# Patient Record
Sex: FEMALE | Race: WHITE | Employment: OTHER | URBAN - METROPOLITAN AREA
[De-identification: names, ages, dates, MRNs, and addresses within clinical notes are randomized per-mention and may not be internally consistent; named-entity substitution may affect disease eponyms.]

---

## 2021-11-03 ENCOUNTER — OFFICE VISIT (OUTPATIENT)
Dept: SURGERY | Age: 81
End: 2021-11-03
Payer: MEDICARE

## 2021-11-03 VITALS
SYSTOLIC BLOOD PRESSURE: 136 MMHG | DIASTOLIC BLOOD PRESSURE: 69 MMHG | BODY MASS INDEX: 26.46 KG/M2 | WEIGHT: 134.8 LBS | RESPIRATION RATE: 18 BRPM | HEIGHT: 60 IN | OXYGEN SATURATION: 96 % | TEMPERATURE: 98.6 F | HEART RATE: 84 BPM

## 2021-11-03 DIAGNOSIS — K43.5 PARASTOMAL HERNIA WITHOUT OBSTRUCTION OR GANGRENE: Primary | ICD-10-CM

## 2021-11-03 PROCEDURE — G8536 NO DOC ELDER MAL SCRN: HCPCS | Performed by: SURGERY

## 2021-11-03 PROCEDURE — G8400 PT W/DXA NO RESULTS DOC: HCPCS | Performed by: SURGERY

## 2021-11-03 PROCEDURE — 1090F PRES/ABSN URINE INCON ASSESS: CPT | Performed by: SURGERY

## 2021-11-03 PROCEDURE — G8419 CALC BMI OUT NRM PARAM NOF/U: HCPCS | Performed by: SURGERY

## 2021-11-03 PROCEDURE — 99205 OFFICE O/P NEW HI 60 MIN: CPT | Performed by: SURGERY

## 2021-11-03 PROCEDURE — G8427 DOCREV CUR MEDS BY ELIG CLIN: HCPCS | Performed by: SURGERY

## 2021-11-03 PROCEDURE — 1101F PT FALLS ASSESS-DOCD LE1/YR: CPT | Performed by: SURGERY

## 2021-11-03 PROCEDURE — G8510 SCR DEP NEG, NO PLAN REQD: HCPCS | Performed by: SURGERY

## 2021-11-03 NOTE — PROGRESS NOTES
1. Have you been to the ER, urgent care clinic since your last visit? Hospitalized since your last visit? No     2. Have you seen or consulted any other health care providers outside of the [address] [place] [address] since your last visit? Include any pap smears or colon screening.  No

## 2021-11-05 NOTE — PROGRESS NOTES
[facility] Surgical Specialists at [facility] [place] Surgery History and Physical    History of Present Illness:      Yuval Devi is a 80 y.o. female who has a history of an open cystectomy with ileal conduit. She has had 2 prior attempts at parastomal hernia repairs. The first was done by Dr. Gaviota Jamil and the second was done by Dr. Gunner Gutierrez in 2018. Her second repair with Dr. Gunner Gutierrez was done laparoscopically and also had an incisional hernia repair done at the same time. After reading the operation note it appears he did a combination of open repair with primary figure-of-eight sutures and then did a laparoscopic repair with a 9 x 15 cm piece of Prograf mesh done in a keyhole fashion with the mesh being intraperitoneal.  We then used a second piece of 9 x 15 cm Prograf mesh for the incisional hernia repair. 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 She does not have any pain from the hernia. She does have a large bulge. She has been having normal bowel movements. Her ileal conduit seems to be working just fine.     Past Medical History:   Diagnosis Date    Arthritis     BACK AND VERTEBRAE     Burning with urination     Urostomy    Cancer ([facility] [address] 07/2014    BLADDER    Osteopenia     Thyroid disease        Past Surgical History:   Procedure Laterality Date    HX APPENDECTOMY  1952    HX CATARACT REMOVAL  2009    EDDY    HX HYSTERECTOMY      HX KNEE ARTHROSCOPY Left 2001    AND MENISCECTOMY    HX OTHER SURGICAL  1966    LIPOMA-BACK    HX TONSILLECTOMY  1946    HX UROLOGICAL  2014    TURBT X 2 ( 7/30 AND 8/27     HX UROLOGICAL  2014    CYSTOSCOPY W/ BX  AND 1/5/2015    HX UROLOGICAL  01/05/2015    RADICAL CYSTECTOMY , ILEAL CONDUIT DIVERSION WITH PELVIC LYMPH NODE DISSECTION AND VERA BSO    VASCULAR SURGERY PROCEDURE UNLIST  1997    L LEG VEIN STRIPPING    VASCULAR SURGERY PROCEDURE UNLIST  2012    SCLEROTHERAPY & ELP L LEG         Current Outpatient Medications:     ferrous sulfate 325 mg (65 mg iron) tablet, Take 1 Tab by mouth Daily (before breakfast). , Disp: , Rfl:     multivitamin (ONE A DAY) tablet, Take 1 Tab by mouth daily. , Disp: , Rfl:     OTHER, KUSH C WITH BIOFLAVONOIDS  500MG DAILY, Disp: , Rfl:     VIT A/VIT C/VIT E/ZINC/COPPER (PRESERVISION AREDS PO), Take  by mouth. 2 SOFT GELS  A DAY, Disp: , Rfl:     docusate sodium (COLACE) 100 mg capsule, Take 100 mg by mouth two (2) times a day., Disp: , Rfl:     magnesium 250 mg tab, Take 250 mg by mouth daily. , Disp: , Rfl:     ASCORBATE CALCIUM (KUSH-C PO), Take 500 mg by mouth daily. , Disp: , Rfl:     DOCOSAHEXANOIC ACID/EPA (FISH OIL PO), Take 1,200 mg by mouth daily. 1200 mg , Disp: , Rfl:     ASPIRIN (ASPIR-81 PO), Take 1 Tab by mouth daily. (Patient not taking: Reported on 11/3/2021), Disp: , Rfl:     HYDROcodone-acetaminophen (NORCO) 7.5-325 mg per tablet, Take 1 Tab by mouth every six (6) hours as needed for Pain. Max Daily Amount: 4 Tabs. (Patient not taking: Reported on 11/3/2021), Disp: 10 Tab, Rfl: o    HYDROcodone-acetaminophen (NORCO) 7.5-325 mg per tablet, Take 1 tablet by mouth every six (6) hours as needed for Pain. (Patient not taking: Reported on 11/3/2021), Disp: 20 tablet, Rfl: o    lutein 6 mg tab, Take  by mouth daily. (Patient not taking: Reported on 11/3/2021), Disp: , Rfl:     No Known Allergies    Social History     Socioeconomic History    Marital status: SINGLE     Spouse name: Not on file    Number of children: Not on file    Years of education: Not on file    Highest education level: Not on file   Occupational History    Not on file   Tobacco Use    Smoking status: Former Smoker     Packs/day: 1.00     Years: 30.00     Pack years: 30.00     Quit date: 1995     Years since quittin.8    Smokeless tobacco: Never Used   Substance and Sexual Activity    Alcohol use:  Yes     Alcohol/week: 2.0 standard drinks     Types: 2 Shots of liquor per week     Comment: 2 GLASSES OF SCOTCH EVERY WEEKEND    Drug use: No    Sexual activity: Not on file   Other Topics Concern    Not on file   Social History Narrative    Not on file     Social Determinants of Health     Financial Resource Strain:     Difficulty of Paying Living Expenses: Not on file   Food Insecurity:     Worried About Running Out of Food in the Last Year: Not on file    Terrell of Food in the Last Year: Not on file   Transportation Needs:     Lack of Transportation (Medical): Not on file    Lack of Transportation (Non-Medical):  Not on file   Physical Activity:     Days of Exercise per Week: Not on file    Minutes of Exercise per Session: Not on file   Stress:     Feeling of Stress : Not on file   Social Connections:     Frequency of Communication with Friends and Family: Not on file    Frequency of Social Gatherings with Friends and Family: Not on file    Attends Protestant Services: Not on file    Active Member of [address] [place] [address] [employer] or Organizations: Not on file    Attends Club or Organization Meetings: Not on file    Marital Status: Not on file   Intimate Partner Violence:     Fear of Current or Ex-Partner: Not on file    Emotionally Abused: Not on file    Physically Abused: Not on file    Sexually Abused: Not on file   Housing Stability:     Unable to Pay for Housing in the Last Year: Not on file    Number of [place] in the Last Year: Not on file    Unstable Housing in the Last Year: Not on file       Family History   Problem Relation Age of Onset    Cancer Mother         KIDNEY    Lung Disease Father         COPD    Heart Disease Father     Diabetes Sister         TYPE II    Asthma Sister     Anesth Problems Neg Hx     Alcohol abuse Neg Hx        ROS   Constitutional: negative  Ears, Nose, Mouth, Throat, and Face: negative  Respiratory: negative  Cardiovascular: negative  Gastrointestinal: Parastomal hernia with ileal conduit  Genitourinary:negative  Integument/Breast: negative  Hematologic/Lymphatic: negative  Behavioral/Psychiatric: negative  Allergic/Immunologic: negative      Physical Exam:     Visit Vitals  /69 (BP 1 Location: Right arm, BP Patient Position: Sitting, BP Cuff Size: Adult)   Pulse 84   Temp 98.6 °F (37 °C) (Oral)   Resp 18   Ht 5' (1.524 m)   Wt 134 lb 12.8 oz (61.1 kg)   SpO2 96%   BMI 26.33 kg/m²       General - alert and oriented, no apparent distress  HEENT - no jaundice, no hearing imparement  Pulm - CTAB, no C/W/R  CV - RRR, no M/R/G  Abd -soft, nondistended, bowel sounds present, parastomal hernia present with large bulge in the area of the ileal conduit, ileal conduit ileum appears to be viable with urine in the bag  Ext - pulses intact in UE and LE bilaterally, no edema  Skin - supple, no rashes  Psychiatric - normal affect, good mood    Labs  Lab Results   Component Value Date/Time    Sodium 139 10/18/2016 03:49 AM    Potassium 3.6 10/18/2016 03:49 AM    Chloride 106 10/18/2016 03:49 AM    CO2 26 10/18/2016 03:49 AM    Anion gap 7 10/18/2016 03:49 AM    Glucose 122 (H) 10/18/2016 03:49 AM    BUN 5 (L) 10/18/2016 03:49 AM    Creatinine 0.81 10/18/2016 03:49 AM    BUN/Creatinine ratio 6 (L) 10/18/2016 03:49 AM    GFR est AA >60 10/18/2016 03:49 AM    GFR est non-AA >60 10/18/2016 03:49 AM    Calcium 8.3 (L) 10/18/2016 03:49 AM    Bilirubin, total 0.5 10/07/2016 11:36 AM    Alk.  phosphatase 76 10/07/2016 11:36 AM    Protein, total 7.3 10/07/2016 11:36 AM    Albumin 4.1 10/07/2016 11:36 AM    Globulin 3.2 10/07/2016 11:36 AM    A-G Ratio 1.3 10/07/2016 11:36 AM    ALT (SGPT) 17 10/07/2016 11:36 AM    AST (SGOT) 23 10/07/2016 11:36 AM     Lab Results   Component Value Date/Time    WBC 4.4 10/07/2016 11:36 AM    Hemoglobin (POC) 9.5 (A) 10/17/2016 06:38 AM    HGB 7.9 (L) 10/18/2016 03:56 AM    HCT 25.5 (L) 10/18/2016 03:56 AM    PLATELET 493 57/37/3526 11:36 AM    MCV 82.3 10/07/2016 11:36 AM         Imaging  CT scan from [facility]-cystectomy with right lower quadrant ileal conduit mild left hydronephrosis unchanged from prior CT scan, parastomal hernia at the ileal conduit containing the right colon as well as the terminal ileum, no evidence of obstruction or incarceration  I have reviewed and agree with all of the pertinent images    Assessment:     Jan Flores is a 80 y.o. female with multiply recurrent parastomal hernia    Recommendations:     1. The patient has a multiply recurrent complex parastomal hernia. At this point the patient is asymptomatic from the hernia. Repair of this is possible although would be likely somewhat difficult. The mesh technique that was used for repair by Dr. Lashay Wayne is prone to failure. The better repair for her would be a robotic [place] repair with mesh. Using the keyhole technique leaves the gap so that the hernias can recur. Since the patient is not symptomatic from the hernia at this point right now I will hold off on surgery for the meantime. I will get a CT scan in our system so I can review the images further and make a better assessment. I will have her come back in about 3 months to see how she is doing. We can attempt repair but no that this repair may be very difficult. Lila Aguilera MD    Greater than half of the time: 60 minutes was used in counciling the patient about diagnosis and treatment plan    Ms. Alexx High has a reminder for a \"due or due soon\" health maintenance. I have asked that she contact her primary care provider for follow-up on this health maintenance.

## 2021-11-20 ENCOUNTER — HOSPITAL ENCOUNTER (OUTPATIENT)
Dept: CT IMAGING | Age: 81
Discharge: HOME | End: 2021-11-20
Attending: SURGERY
Payer: MEDICARE

## 2021-11-20 DIAGNOSIS — K43.5 PARASTOMAL HERNIA WITHOUT OBSTRUCTION OR GANGRENE: ICD-10-CM

## 2021-11-20 PROCEDURE — 74177 CT ABD & PELVIS W/CONTRAST: CPT

## 2021-11-20 PROCEDURE — 74011000636 HC RX REV CODE- 636: Performed by: RADIOLOGY

## 2021-11-20 RX ADMIN — IOPAMIDOL 100 ML: 755 INJECTION, SOLUTION INTRAVENOUS at 10:58

## 2022-02-23 ENCOUNTER — OFFICE VISIT (OUTPATIENT)
Dept: SURGERY | Age: 82
End: 2022-02-23
Payer: MEDICARE

## 2022-02-23 VITALS
RESPIRATION RATE: 18 BRPM | TEMPERATURE: 98 F | SYSTOLIC BLOOD PRESSURE: 155 MMHG | HEART RATE: 80 BPM | HEIGHT: 60 IN | DIASTOLIC BLOOD PRESSURE: 75 MMHG | BODY MASS INDEX: 26.62 KG/M2 | WEIGHT: 135.6 LBS | OXYGEN SATURATION: 94 %

## 2022-02-23 DIAGNOSIS — K43.5 PARASTOMAL HERNIA WITHOUT OBSTRUCTION OR GANGRENE: Primary | ICD-10-CM

## 2022-02-23 PROCEDURE — 1101F PT FALLS ASSESS-DOCD LE1/YR: CPT | Performed by: SURGERY

## 2022-02-23 PROCEDURE — G8427 DOCREV CUR MEDS BY ELIG CLIN: HCPCS | Performed by: SURGERY

## 2022-02-23 PROCEDURE — G8419 CALC BMI OUT NRM PARAM NOF/U: HCPCS | Performed by: SURGERY

## 2022-02-23 PROCEDURE — G8510 SCR DEP NEG, NO PLAN REQD: HCPCS | Performed by: SURGERY

## 2022-02-23 PROCEDURE — G8536 NO DOC ELDER MAL SCRN: HCPCS | Performed by: SURGERY

## 2022-02-23 PROCEDURE — G8400 PT W/DXA NO RESULTS DOC: HCPCS | Performed by: SURGERY

## 2022-02-23 PROCEDURE — 1090F PRES/ABSN URINE INCON ASSESS: CPT | Performed by: SURGERY

## 2022-02-23 PROCEDURE — 99215 OFFICE O/P EST HI 40 MIN: CPT | Performed by: SURGERY

## 2022-02-23 RX ORDER — LEVOTHYROXINE SODIUM 75 UG/1
75 TABLET ORAL EVERY OTHER DAY
COMMUNITY

## 2022-02-23 RX ORDER — LEVOTHYROXINE SODIUM 88 UG/1
88 TABLET ORAL EVERY OTHER DAY
COMMUNITY

## 2022-02-23 NOTE — PROGRESS NOTES
1. Have you been to the ER, urgent care clinic since your last visit? Hospitalized since your last visit? No    2. Have you seen or consulted any other health care providers outside of the [address] [place] [address] since your last visit? Include any pap smears or colon screening.  No

## 2022-02-23 NOTE — LETTER
2/23/2022    Patient: Sybil Tejada   YOB: 1940   Date of Visit: 2/23/2022     Sherial Fothergill, MD  [facility] [address] [place] [id number]  Via Fax: [phone]    Dear Sherial Fothergill, MD,      Thank you for referring Ms. Layla Partida to [address] for evaluation. My notes for this consultation are attached. If you have questions, please do not hesitate to call me. I look forward to following your patient along with you.       Sincerely,    Consuelo Chow MD

## 2022-02-23 NOTE — PROGRESS NOTES
[facility] [place] Surgical Specialists at [facility] Surgery History and Physical    History of Present Illness:      Torito Carrillo is a 80 y.o. female who has a history of an open cystectomy with ileal conduit. She has had 2 prior attempts at parastomal hernia repairs. The first was done by Dr. Taj Valenzuela and the second was done by Dr. Cheryl Cee in 2018. Her second repair with Dr. Cheryl Cee was done laparoscopically and also had an incisional hernia repair done at the same time. After reading the operation note it appears he did a combination of open repair with primary figure-of-eight sutures and then did a laparoscopic repair with a 9 x 15 cm piece of Prograf mesh done in a keyhole fashion with the mesh being intraperitoneal.  We then used a second piece of 9 x 15 cm Prograf mesh for the incisional hernia repair. 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 She does not have any pain from the hernia. She does have a large bulge. She has been having normal bowel movements. Her ileal conduit seems to be working just fine    She is back to see me after her CT scan. She does not have any changes in the hernia. She still does not have any pain from the ileal conduit parastomal hernia.     Past Medical History:   Diagnosis Date    Arthritis     BACK AND VERTEBRAE     Burning with urination     Urostomy    Cancer ([facility] [address] 07/2014    BLADDER    Osteopenia     Thyroid disease        Past Surgical History:   Procedure Laterality Date    HX APPENDECTOMY  1952    HX CATARACT REMOVAL  2009    EDDY    HX HYSTERECTOMY      HX KNEE ARTHROSCOPY Left 2001    AND MENISCECTOMY    HX OTHER SURGICAL  1966    LIPOMA-BACK    HX TONSILLECTOMY  1946    HX UROLOGICAL  2014    TURBT X 2 ( 7/30 AND 8/27     HX UROLOGICAL  2014    CYSTOSCOPY W/ BX  AND 1/5/2015    HX UROLOGICAL  01/05/2015    RADICAL CYSTECTOMY , ILEAL CONDUIT DIVERSION WITH PELVIC LYMPH NODE DISSECTION AND VERA BSO    VASCULAR SURGERY PROCEDURE UNLIST  1997 L LEG VEIN STRIPPING    VASCULAR SURGERY PROCEDURE UNLIST  2012    SCLEROTHERAPY & ELP L LEG         Current Outpatient Medications:     levothyroxine (Synthroid) 75 mcg tablet, Take 75 mcg by mouth every other day., Disp: , Rfl:     levothyroxine (Synthroid) 88 mcg tablet, Take 88 mcg by mouth every other day., Disp: , Rfl:     ASPIRIN (ASPIR-81 PO), Take 1 Tablet by mouth daily. , Disp: , Rfl:     multivitamin (ONE A DAY) tablet, Take 1 Tab by mouth daily. , Disp: , Rfl:     OTHER, KUSH C WITH BIOFLAVONOIDS  500MG DAILY, Disp: , Rfl:     VIT A/VIT C/VIT E/ZINC/COPPER (PRESERVISION AREDS PO), Take  by mouth. 2 SOFT GELS  A DAY, Disp: , Rfl:     magnesium 250 mg tab, Take 250 mg by mouth daily. , Disp: , Rfl:     ASCORBATE CALCIUM (KUSH-C PO), Take 500 mg by mouth daily. , Disp: , Rfl:     DOCOSAHEXANOIC ACID/EPA (FISH OIL PO), Take 1,200 mg by mouth daily. 1200 mg , Disp: , Rfl:     ferrous sulfate 325 mg (65 mg iron) tablet, Take 1 Tab by mouth Daily (before breakfast). , Disp: , Rfl:     HYDROcodone-acetaminophen (NORCO) 7.5-325 mg per tablet, Take 1 Tab by mouth every six (6) hours as needed for Pain. Max Daily Amount: 4 Tabs. (Patient not taking: Reported on 11/3/2021), Disp: 10 Tab, Rfl: o    docusate sodium (COLACE) 100 mg capsule, Take 100 mg by mouth two (2) times a day., Disp: , Rfl:     HYDROcodone-acetaminophen (NORCO) 7.5-325 mg per tablet, Take 1 tablet by mouth every six (6) hours as needed for Pain. (Patient not taking: Reported on 11/3/2021), Disp: 20 tablet, Rfl: o    lutein 6 mg tab, Take  by mouth daily.  (Patient not taking: Reported on 11/3/2021), Disp: , Rfl:     No Known Allergies    Social History     Socioeconomic History    Marital status: SINGLE     Spouse name: Not on file    Number of children: Not on file    Years of education: Not on file    Highest education level: Not on file   Occupational History    Not on file   Tobacco Use    Smoking status: Former Smoker Packs/day: 1.00     Years: 30.00     Pack years: 30.00     Quit date: 1995     Years since quittin.1    Smokeless tobacco: Never Used   Substance and Sexual Activity    Alcohol use: Yes     Alcohol/week: 2.0 standard drinks     Types: 2 Shots of liquor per week     Comment: 2 GLASSES OF SCOTCH EVERY WEEKEND    Drug use: No    Sexual activity: Not on file   Other Topics Concern    Not on file   Social History Narrative    Not on file     Social Determinants of Health     Financial Resource Strain:     Difficulty of Paying Living Expenses: Not on file   Food Insecurity:     Worried About Running Out of Food in the Last Year: Not on file    Terrell of Food in the Last Year: Not on file   Transportation Needs:     Lack of Transportation (Medical): Not on file    Lack of Transportation (Non-Medical):  Not on file   Physical Activity:     Days of Exercise per Week: Not on file    Minutes of Exercise per Session: Not on file   Stress:     Feeling of Stress : Not on file   Social Connections:     Frequency of Communication with Friends and Family: Not on file    Frequency of Social Gatherings with Friends and Family: Not on file    Attends Rastafari Services: Not on file    Active Member of [address] [place] [address] [address] or Organizations: Not on file    Attends Club or Organization Meetings: Not on file    Marital Status: Not on file   Intimate Partner Violence:     Fear of Current or Ex-Partner: Not on file    Emotionally Abused: Not on file    Physically Abused: Not on file    Sexually Abused: Not on file   Housing Stability:     Unable to Pay for Housing in the Last Year: Not on file    Number of [place] in the Last Year: Not on file    Unstable Housing in the Last Year: Not on file       Family History   Problem Relation Age of Onset    Cancer Mother         KIDNEY    Lung Disease Father         COPD    Heart Disease Father     Diabetes Sister         TYPE II    Asthma Sister    Alonzo Conquest Problems Neg Hx     Alcohol abuse Neg Hx        ROS   Constitutional: negative  Ears, Nose, Mouth, Throat, and Face: negative  Respiratory: negative  Cardiovascular: negative  Gastrointestinal: positive for Parastomal hernia  Genitourinary:negative  Integument/Breast: negative  Hematologic/Lymphatic: negative  Behavioral/Psychiatric: negative  Allergic/Immunologic: negative      Physical Exam:     Visit Vitals  BP (!) 155/75 (BP 1 Location: Right arm, BP Patient Position: Sitting, BP Cuff Size: Small adult)   Pulse 80   Temp 98 °F (36.7 °C) (Oral)   Resp 18   Ht 5' (1.524 m)   Wt 135 lb 9.6 oz (61.5 kg)   SpO2 94%   BMI 26.48 kg/m²       General - alert and oriented, no apparent distress  HEENT - no jaundice, no hearing imparement  Pulm - CTAB, no C/W/R  CV - RRR, no M/R/G  Abd -soft, nondistended, bowel sounds present, large parastomal hernia with large bulge in the right lower quadrant that is soft partially reducible nontender to palpation well-appearing ileal conduit mucosa  Ext - pulses intact in UE and LE bilaterally, no edema  Skin - supple, no rashes  Psychiatric - normal affect, good mood    Labs  Lab Results   Component Value Date/Time    Sodium 139 10/18/2016 03:49 AM    Potassium 3.6 10/18/2016 03:49 AM    Chloride 106 10/18/2016 03:49 AM    CO2 26 10/18/2016 03:49 AM    Anion gap 7 10/18/2016 03:49 AM    Glucose 122 (H) 10/18/2016 03:49 AM    BUN 5 (L) 10/18/2016 03:49 AM    Creatinine 0.81 10/18/2016 03:49 AM    BUN/Creatinine ratio 6 (L) 10/18/2016 03:49 AM    GFR est AA >60 10/18/2016 03:49 AM    GFR est non-AA >60 10/18/2016 03:49 AM    Calcium 8.3 (L) 10/18/2016 03:49 AM    Bilirubin, total 0.5 10/07/2016 11:36 AM    Alk.  phosphatase 76 10/07/2016 11:36 AM    Protein, total 7.3 10/07/2016 11:36 AM    Albumin 4.1 10/07/2016 11:36 AM    Globulin 3.2 10/07/2016 11:36 AM    A-G Ratio 1.3 10/07/2016 11:36 AM    ALT (SGPT) 17 10/07/2016 11:36 AM    AST (SGOT) 23 10/07/2016 11:36 AM     Lab Results   Component Value Date/Time    WBC 4.4 10/07/2016 11:36 AM    Hemoglobin (POC) 9.5 (A) 10/17/2016 06:38 AM    HGB 7.9 (L) 10/18/2016 03:56 AM    HCT 25.5 (L) 10/18/2016 03:56 AM    PLATELET 551 08/35/5870 11:36 AM    MCV 82.3 10/07/2016 11:36 AM         Imaging  CT abdomen pelvis-FINDINGS:   LOWER THORAX: No consolidation. There are coronary artery calcifications. Left  hemidiaphragm is elevated  LIVER: Fatty liver  BILIARY TREE: Gallbladder is within normal limits. CBD is not dilated. SPLEEN: within normal limits. PANCREAS: No mass or ductal dilatation. ADRENALS: Unremarkable. KIDNEYS: No stone or hydronephrosis. Subcentimeter hypodensity right kidney  likely a cyst.  STOMACH: Unremarkable. BOWEL: No dilatation or wall thickening. Right lower quadrant ostomy. There is a  large parastomal hernia containing both large and small bowel. No pneumatosis. The mouth of the hernia measures 5.9 x 6.2 cm. There is no wall edema or  pneumatosis. APPENDIX: Not identified  PERITONEUM: No ascites or pneumoperitoneum. RETROPERITONEUM: No lymphadenopathy or aortic aneurysm. Extensive vascular  calcifications  REPRODUCTIVE ORGANS: Surgically absent  URINARY BLADDER: No mass or calculus. BONES: No destructive bone lesion. ADDITIONAL COMMENTS: N/A     IMPRESSION     1. Large right lower quadrant parastomal hernia containing the cecum but no  evidence of obstruction wall thickening or pneumatosis  I have reviewed and agree with all of the pertinent images    Assessment:     Radha Reynolds is a 80 y.o. female with large parastomal hernia with ileal conduit    Recommendations:     1. The CT scan shows a large parastomal hernia as expected. Defect measures about 6 cm around. She appears to have some cecum up in the hernia. At this point the patient is not having any pain from the hernia. She is a very complex surgical situation with multiple hernia repairs of this in the past.  Surgical revision is not advised at all in this patient. The surgery is high risk and she has had multiple surgeries which makes it even more high risk. She will more likely outlive any complications with her parastomal hernia. She will keep an eye on it for now. She will come back and see me if it becomes increase in late painful or difficult to deal with in the future. We had a long discussion about the management and decision making of the surgery. Elmira Mary MD    Greater than half of the time: 45 minutes was used in counciling the patient about diagnosis and treatment plan    Ms. Rosalind Soriano has a reminder for a \"due or due soon\" health maintenance. I have asked that she contact her primary care provider for follow-up on this health maintenance.

## 2022-10-14 ENCOUNTER — HOSPITAL ENCOUNTER (EMERGENCY)
Age: 82
Discharge: HOME | End: 2022-10-14
Attending: EMERGENCY MEDICINE
Payer: MEDICARE

## 2022-10-14 VITALS
HEIGHT: 60 IN | TEMPERATURE: 98.2 F | DIASTOLIC BLOOD PRESSURE: 80 MMHG | OXYGEN SATURATION: 96 % | WEIGHT: 138 LBS | RESPIRATION RATE: 14 BRPM | BODY MASS INDEX: 27.09 KG/M2 | SYSTOLIC BLOOD PRESSURE: 170 MMHG | HEART RATE: 100 BPM

## 2022-10-14 DIAGNOSIS — S61.211A LACERATION OF LEFT INDEX FINGER WITHOUT FOREIGN BODY WITHOUT DAMAGE TO NAIL, INITIAL ENCOUNTER: Primary | ICD-10-CM

## 2022-10-14 PROCEDURE — 75810000293 HC SIMP/SUPERF WND  RPR

## 2022-10-14 PROCEDURE — 99282 EMERGENCY DEPT VISIT SF MDM: CPT

## 2022-10-15 NOTE — ED PROVIDER NOTES
66-year-old female presenting ER with laceration on the tip of her left index finger. Patient reports that she was getting out a brand-new knife when she cut Finger on the blade. Patient reports bleeding seems to continue despite direct pressure. No anticoagulation. Tetanus is up-to-date within the last 10 years. No numbness tingling or weakness. No other injuries.        Past Medical History:   Diagnosis Date    Arthritis     BACK AND VERTEBRAE     Burning with urination     Urostomy    Cancer ([address] 2014    BLADDER    Osteopenia     Thyroid disease        Past Surgical History:   Procedure Laterality Date    HX APPENDECTOMY      HX CATARACT REMOVAL  2009    EDDY    HX HYSTERECTOMY      HX KNEE ARTHROSCOPY Left     AND MENISCECTOMY    HX OTHER SURGICAL  1966    LIPOMA-BACK    HX TONSILLECTOMY      HX UROLOGICAL      TURBT X 2 (  AND      HX UROLOGICAL      CYSTOSCOPY W/ BX  AND 2015    HX UROLOGICAL  2015    RADICAL CYSTECTOMY , ILEAL CONDUIT DIVERSION WITH PELVIC LYMPH NODE DISSECTION AND VERA BSO    VASCULAR SURGERY PROCEDURE UNLIST      L LEG VEIN STRIPPING    VASCULAR SURGERY PROCEDURE UNLIST      SCLEROTHERAPY & ELP L LEG         Family History:   Problem Relation Age of Onset    Cancer Mother         KIDNEY    Lung Disease Father         COPD    Heart Disease Father     Diabetes Sister         TYPE II    Asthma Sister     Anesth Problems Neg Hx     Alcohol abuse Neg Hx        Social History     Socioeconomic History    Marital status: SINGLE     Spouse name: Not on file    Number of children: Not on file    Years of education: Not on file    Highest education level: Not on file   Occupational History    Not on file   Tobacco Use    Smoking status: Former     Packs/day: 1.00     Years: 30.00     Pack years: 30.00     Types: Cigarettes     Quit date: 1995     Years since quittin.8    Smokeless tobacco: Never   Vaping Use    Vaping Use: Not on file Substance and Sexual Activity    Alcohol use: Yes     Alcohol/week: 2.0 standard drinks     Types: 2 Shots of liquor per week     Comment: 2 GLASSES OF SCOTCH EVERY WEEKEND    Drug use: No    Sexual activity: Not on file   Other Topics Concern    Not on file   Social History Narrative    Not on file     Social Determinants of Health     Financial Resource Strain: Not on file   Food Insecurity: Not on file   Transportation Needs: Not on file   Physical Activity: Not on file   Stress: Not on file   Social Connections: Not on file   Intimate Partner Violence: Not on file   Housing Stability: Not on file         ALLERGIES: Patient has no known allergies. Review of Systems   Skin:  Positive for wound. All other systems reviewed and are negative. Vitals:    10/14/22 2201   BP: (!) 170/80   Pulse: 100   Resp: 14   Temp: 98.2 °F (36.8 °C)   SpO2: 96%   Weight: 62.6 kg (138 lb)   Height: 5' (1.524 m)            Physical Exam  Constitutional:       Appearance: Normal appearance. HENT:      Head: Normocephalic. Nose: Nose normal.   Eyes:      Conjunctiva/sclera: Conjunctivae normal.   Cardiovascular:      Rate and Rhythm: Normal rate. Pulmonary:      Effort: Pulmonary effort is normal. No respiratory distress. Musculoskeletal:         General: Signs of injury present. No swelling or deformity. Normal range of motion. Cervical back: Neck supple. Comments: Less than 0.5 cm laceration on the tip of the left index finger, superficial with capillary oozing. Neurovascularly intact   Skin:     General: Skin is warm. Capillary Refill: Capillary refill takes less than 2 seconds. Findings: Laceration present. Neurological:      General: No focal deficit present. Mental Status: She is alert.         MDM  Number of Diagnoses or Management Options  Laceration of left index finger without foreign body without damage to nail, initial encounter  Diagnosis management comments: Discussed the discharge impression and any labs and the results with the patient. Answered any questions and addressed any concerns. Discussed the importance of following up with their primary care provider and/or specialist.  Discussed signs or symptoms that would warrant return back to the ER for further evaluation. The patient is agreeable with discharge. Wound Closure by Adhesive    Date/Time: 10/14/2022 10:15 PM  Performed by: Nel Hamm MD  Authorized by: Nel Hamm MD     Consent:     Consent obtained:  Verbal    Consent given by:  Patient    Risks, benefits, and alternatives were discussed: yes      Risks discussed:  Infection and pain    Alternatives discussed:  No treatment and referral  Universal protocol:     Patient identity confirmed:  Verbally with patient and arm band  Anesthesia:     Anesthesia method:  None  Laceration details:     Location: tip of left index finger.     Length (cm):  0.5  Exploration:     Hemostasis achieved with:  Direct pressure and tourniquet    Contaminated: no    Treatment:     Area cleansed with:  Soap and water    Visualized foreign bodies/material removed: no      Debridement:  None    Undermining:  None    Scar revision: no    Skin repair:     Repair method:  Tissue adhesive  Approximation:     Approximation:  Close  Repair type:     Repair type:  Simple  Post-procedure details:     Dressing:  Open (no dressing)    Procedure completion:  Tolerated well, no immediate complications

## 2022-10-16 ENCOUNTER — HOSPITAL ENCOUNTER (EMERGENCY)
Age: 82
Discharge: HOME | End: 2022-10-16
Attending: EMERGENCY MEDICINE
Payer: MEDICARE

## 2022-10-16 VITALS
DIASTOLIC BLOOD PRESSURE: 80 MMHG | WEIGHT: 138 LBS | OXYGEN SATURATION: 98 % | RESPIRATION RATE: 16 BRPM | SYSTOLIC BLOOD PRESSURE: 177 MMHG | HEIGHT: 60 IN | TEMPERATURE: 98.3 F | BODY MASS INDEX: 27.09 KG/M2 | HEART RATE: 83 BPM

## 2022-10-16 DIAGNOSIS — S61.211D LACERATION OF LEFT INDEX FINGER, SUBSEQUENT ENCOUNTER: ICD-10-CM

## 2022-10-16 DIAGNOSIS — L08.9 INFECTION OF INDEX FINGER: Primary | ICD-10-CM

## 2022-10-16 PROCEDURE — 99283 EMERGENCY DEPT VISIT LOW MDM: CPT

## 2022-10-16 RX ORDER — CEPHALEXIN 500 MG/1
500 CAPSULE ORAL 3 TIMES DAILY
Qty: 21 CAPSULE | Refills: 0 | Status: SHIPPED | OUTPATIENT
Start: 2022-10-16 | End: 2022-10-23

## 2022-10-16 NOTE — ED PROVIDER NOTES
The history is provided by the patient. Wound Check   This is a new problem. The current episode started 2 days ago. The problem occurs constantly. The problem has not changed since onset. Pain location: left index finger. The quality of the pain is described as aching. The pain is mild. Associated symptoms comments: Some blood under dermabond, slightly swollen and red discoloration. Treatments tried: adhesive wound closure. There has been a history of trauma (cut finger).       Past Medical History:   Diagnosis Date    Arthritis     BACK AND VERTEBRAE     Burning with urination     Urostomy    Cancer ([address] 07/2014    BLADDER    Osteopenia     Thyroid disease        Past Surgical History:   Procedure Laterality Date    HX APPENDECTOMY  1952    HX CATARACT REMOVAL  2009    EDDY    HX HYSTERECTOMY      HX KNEE ARTHROSCOPY Left 2001    AND MENISCECTOMY    HX OTHER SURGICAL  1966    LIPOMA-BACK    HX TONSILLECTOMY  1946    HX UROLOGICAL  2014    TURBT X 2 ( 7/30 AND 8/27     HX UROLOGICAL  2014    CYSTOSCOPY W/ BX  AND 1/5/2015    HX UROLOGICAL  01/05/2015    RADICAL CYSTECTOMY , ILEAL CONDUIT DIVERSION WITH PELVIC LYMPH NODE DISSECTION AND VERA BSO    VASCULAR SURGERY PROCEDURE UNLIST  1997    L LEG VEIN STRIPPING    VASCULAR SURGERY PROCEDURE UNLIST  2012    SCLEROTHERAPY & ELP L LEG         Family History:   Problem Relation Age of Onset    Cancer Mother         KIDNEY    Lung Disease Father         COPD    Heart Disease Father     Diabetes Sister         TYPE II    Asthma Sister     Anesth Problems Neg Hx     Alcohol abuse Neg Hx        Social History     Socioeconomic History    Marital status: SINGLE     Spouse name: Not on file    Number of children: Not on file    Years of education: Not on file    Highest education level: Not on file   Occupational History    Not on file   Tobacco Use    Smoking status: Former     Packs/day: 1.00     Years: 30.00     Pack years: 30.00     Types: Cigarettes     Quit date: 1995     Years since quittin.8    Smokeless tobacco: Never   Vaping Use    Vaping Use: Never used   Substance and Sexual Activity    Alcohol use: Yes     Alcohol/week: 2.0 standard drinks     Types: 2 Shots of liquor per week     Comment: 2 GLASSES OF SCOTCH EVERY WEEKEND    Drug use: No    Sexual activity: Not on file   Other Topics Concern    Not on file   Social History Narrative    Not on file     Social Determinants of Health     Financial Resource Strain: Not on file   Food Insecurity: Not on file   Transportation Needs: Not on file   Physical Activity: Not on file   Stress: Not on file   Social Connections: Not on file   Intimate Partner Violence: Not on file   Housing Stability: Not on file         ALLERGIES: Patient has no known allergies. Review of Systems   Skin:  Positive for wound. All other systems reviewed and are negative. Vitals:    10/16/22 0830   BP: (!) 177/80   Pulse: 83   Resp: 16   Temp: 98.3 °F (36.8 °C)   SpO2: 98%   Weight: 62.6 kg (138 lb)   Height: 5' (1.524 m)            Physical Exam  Vitals and nursing note reviewed. Constitutional:       General: She is not in acute distress. Appearance: She is well-developed. HENT:      Head: Normocephalic and atraumatic. Eyes:      Conjunctiva/sclera: Conjunctivae normal.   Cardiovascular:      Rate and Rhythm: Normal rate and regular rhythm. Pulmonary:      Effort: Pulmonary effort is normal. No respiratory distress. Abdominal:      General: There is no distension. Musculoskeletal:         General: No deformity. Normal range of motion. Cervical back: Neck supple. Skin:     General: Skin is warm and dry. Comments: 1 cm laceration with adhesive covering, small amount of blood noted under adhesive. Distal p halanx finger pad is slightly edematous with mild erythema, no warmth. Neurological:      Mental Status: She is alert. Cranial Nerves: No cranial nerve deficit. Psychiatric:         Behavior: Behavior normal.        MDM       6year-old female presents with concern that her adhesive repair on her finger may open and he difficult for her to manage her ostomy. She does appear to have some blood under the glue. Her finger is starting to show early signs of infection so we will cover empirically with Keflex. We discussed freeing the edge of the glue to allow for more appropriate drainage of the wound and mitigate risk of infection. This was performed at bedside without difficulty and wound is now draining. Return precautions discussed for worsening or new concerning symptoms.     Procedures

## 2022-10-16 NOTE — ED TRIAGE NOTES
Pt arrives from home with c/o of left index finger with swelling, numbness, ecchymosis, and coldness to the tip of her finger after receiving dermabond here on Friday night.

## 2023-02-16 ENCOUNTER — HOSPITAL ENCOUNTER (EMERGENCY)
Age: 83
Discharge: HOME | End: 2023-02-16
Attending: EMERGENCY MEDICINE
Payer: MEDICARE

## 2023-02-16 VITALS
HEART RATE: 100 BPM | SYSTOLIC BLOOD PRESSURE: 150 MMHG | BODY MASS INDEX: 27.09 KG/M2 | WEIGHT: 138 LBS | RESPIRATION RATE: 18 BRPM | HEIGHT: 60 IN | TEMPERATURE: 99.2 F | DIASTOLIC BLOOD PRESSURE: 77 MMHG | OXYGEN SATURATION: 95 %

## 2023-02-16 DIAGNOSIS — U07.1 COVID-19: Primary | ICD-10-CM

## 2023-02-16 LAB
SARS-COV-2 RDRP RESP QL NAA+PROBE: DETECTED
SOURCE, COVRS: ABNORMAL

## 2023-02-16 PROCEDURE — 87635 SARS-COV-2 COVID-19 AMP PRB: CPT

## 2023-02-16 PROCEDURE — 99283 EMERGENCY DEPT VISIT LOW MDM: CPT

## 2023-02-16 NOTE — Clinical Note
Work/School Note    Date: 2/16/2023     To Whom It May concern:    Charito Baez was evaluated by the following provider(s):  Attending Provider: Heather Edgar MD  Nurse Practitioner: Navneet Parikh virus is suspected. Per the CDC guidelines we recommend home isolation until the following conditions are all met:    1. At least five days have passed since symptoms first appeared and/or had a close exposure,   2. After home isolation for five days, wearing a mask around others for the next five days,  3. At least 24 have passed since last fever without the use of fever-reducing medications and  4.  Symptoms (eg cough, shortness of breath) have improved      Sincerely,          Deena Bermudez NP

## 2023-02-16 NOTE — ED PROVIDER NOTES
HPI     Past Medical History:   Diagnosis Date    Arthritis     BACK AND VERTEBRAE     Burning with urination     Urostomy    Cancer ([address] 2014    BLADDER    Osteopenia     Thyroid disease        Past Surgical History:   Procedure Laterality Date    HX APPENDECTOMY      HX CATARACT REMOVAL      EDDY    HX HYSTERECTOMY      HX KNEE ARTHROSCOPY Left     AND MENISCECTOMY    HX OTHER SURGICAL  1966    LIPOMA-BACK    HX TONSILLECTOMY  1946    HX UROLOGICAL  2014    TURBT X 2 (  AND      HX UROLOGICAL  2014    CYSTOSCOPY W/ BX  AND 2015    HX UROLOGICAL  2015    RADICAL CYSTECTOMY , ILEAL CONDUIT DIVERSION WITH PELVIC LYMPH NODE DISSECTION AND VERA BSO    [place] UNLIST[place] PROCEDURE VASCULAR SURGERY      L LEG VEIN STRIPPING    [place] UNLISTED PROCEDURE VASCULAR SURGERY      SCLEROTHERAPY & ELP L LEG         Family History:   Problem Relation Age of Onset    Cancer Mother         KIDNEY    Lung Disease Father         COPD    Heart Disease Father     Diabetes Sister         TYPE II    Asthma Sister     Anesth Problems Neg Hx     Alcohol abuse Neg Hx        Social History     Socioeconomic History    Marital status: SINGLE     Spouse name: Not on file    Number of children: Not on file    Years of education: Not on file    Highest education level: Not on file   Occupational History    Not on file   Tobacco Use    Smoking status: Former     Packs/day: 1.00     Years: 30.00     Pack years: 30.00     Types: Cigarettes     Quit date: 1995     Years since quittin.1    Smokeless tobacco: Never   Vaping Use    Vaping Use: Never used   Substance and Sexual Activity    Alcohol use:  Yes     Alcohol/week: 2.0 standard drinks     Types: 2 Shots of liquor per week     Comment: 2 GLASSES OF SCOTCH EVERY WEEKEND    Drug use: No    Sexual activity: Not on file   Other Topics Concern    Not on file   Social History Narrative    Not on file     Social Determinants of Health     Financial Resource Strain: Not on file   Food Insecurity: Not on file   Transportation Needs: Not on file   Physical Activity: Not on file   Stress: Not on file   Social Connections: Not on file   Intimate Partner Violence: Not on file   Housing Stability: Not on file         ALLERGIES: Patient has no known allergies. Review of Systems    Vitals:    02/16/23 1126 02/16/23 1206   BP: (!) 150/77    Pulse: 100    Resp: 18    Temp: 99.2 °F (37.3 °C)    SpO2: 95% 95%   Weight: 62.6 kg (138 lb)    Height: 5' (1.524 m)             Physical Exam     Medical Decision Making  Risk  Prescription drug management. Labs Reviewed   COVID-19 RAPID TEST - Abnormal; Notable for the following components:       Result Value    COVID-19 rapid test Detected (*)     All other components within normal limits       No results found. 12:47 PM  Pt has been reexamined. Pt has no new complaints, changes or physical findings. Care plan outlined and precautions discussed. All available results were reviewed with pt. All medications were reviewed with pt. All of pt's questions and concerns were addressed. Pt agrees to F/U as instructed and agrees to return to ED upon further deterioration. Pt is ready to go home. Baldemar Gomez NP    Please note that this dictation was completed with [email or web address], the computer voice recognition software. Quite often unanticipated grammatical, syntax, homophones, and other interpretive errors are inadvertently transcribed by the computer software. Please disregard these errors. Please excuse any errors that have escaped final proofreading. Thank you.     Procedures note reviewed. Constitutional:       General: She is not in acute distress. Appearance: Normal appearance. She is well-developed. She is not ill-appearing. HENT:      Head: Normocephalic and atraumatic. Right Ear: External ear normal.      Left Ear: External ear normal.      Nose: Nose normal. No congestion. Mouth/Throat:      Mouth: Mucous membranes are moist.   Eyes:      General:         Right eye: No discharge. Left eye: No discharge. Conjunctiva/sclera: Conjunctivae normal.      Pupils: Pupils are equal, round, and reactive to light. Neck:      Vascular: No JVD. Trachea: No tracheal deviation. Cardiovascular:      Rate and Rhythm: Regular rhythm. Tachycardia present. Pulses: Normal pulses. Heart sounds: Normal heart sounds. No murmur heard. No gallop. Pulmonary:      Effort: Pulmonary effort is normal. No respiratory distress. Breath sounds: Normal breath sounds. Chest:      Chest wall: No tenderness. Abdominal:      General: Bowel sounds are normal. There is no distension. Palpations: Abdomen is soft. Tenderness: There is no abdominal tenderness. There is no guarding or rebound. Genitourinary:     Comments: Negative    Musculoskeletal:         General: No tenderness. Normal range of motion. Cervical back: Normal range of motion and neck supple. No tenderness. Skin:     General: Skin is warm and dry. Capillary Refill: Capillary refill takes less than 2 seconds. Coloration: Skin is not pale. Findings: No erythema or rash. Neurological:      General: No focal deficit present. Mental Status: She is alert and oriented to person, place, and time. Motor: No weakness. Coordination: Coordination normal.   Psychiatric:         Mood and Affect: Mood normal.         Behavior: Behavior normal.         Thought Content:  Thought content normal.         Judgment: Judgment normal.        Medical Decision Making  80-year-old female who comes to the emergency room requesting covid test due to exposure to the virus. Patient states her sister that she llives with tested positive on Monday and pt sts she is having a dry cough that started last night. Pt denies cp, sob, and abd pain or any further symptoms. 1. Previous notes (external to Emergency room) were reviewed: chart review    2. History was obtained by: patient    3. Chronic medical issues affecting this visit:   Past Medical History:  No date: Arthritis      Comment:  BACK AND VERTEBRAE   No date: Burning with urination      Comment:  Urostomy  07/2014: Cancer [address] [address]      Comment:  BLADDER  No date: Osteopenia  No date: Thyroid disease  Past Surgical History:  1952: HX APPENDECTOMY  2009: HX CATARACT REMOVAL      Comment:  EDDY  No date: HX HYSTERECTOMY  2001: HX KNEE ARTHROSCOPY; Left      Comment:  AND MENISCECTOMY  1966: HX OTHER SURGICAL      Comment:  LIPOMA-BACK  1946: HX TONSILLECTOMY  2014: HX UROLOGICAL      Comment:  TURBT X 2 ( 7/30 AND 8/27 2014: HX UROLOGICAL      Comment:  CYSTOSCOPY W/ BX  AND 1/5/2015 01/05/2015: HX UROLOGICAL      Comment:  RADICAL CYSTECTOMY , ILEAL CONDUIT DIVERSION WITH PELVIC               LYMPH NODE DISSECTION AND VERA BSO  1997: [place] UNLISTED PROCEDURE VASCULAR SURGERY      Comment:  L LEG VEIN [address]  2012: [place] UNLISTED PROCEDURE VASCULAR SURGERY      Comment:  SCLEROTHERAPY & ELP L LEG        4. I ordered the following tests, followed by review of final reads by lab and radiology: rapid covid    5. I considered ordering: cxr    6. Medical intervention: physical assessment and lab data      Surgical intervention: none indicated    7. Social determinants of health: none    8 Final diagnosis: Covid 19. Medications prescribed: Paxlovid    9. Disposition: Discharge to home and follow up with PCP, return to the emergency room with worsening symptoms. Patient in agreement with plan of care.       Problems Addressed:  COVID-19: acute illness or injury    Amount and/or Complexity of Data Reviewed  External Data Reviewed: notes. Details: Chart review  Labs: ordered. Decision-making details documented in ED Course. Risk  Prescription drug management. Labs Reviewed   COVID-19 RAPID TEST - Abnormal; Notable for the following components:       Result Value    COVID-19 rapid test Detected (*)     All other components within normal limits       No results found. 12:47 PM  Pt has been reexamined. Pt has no new complaints, changes or physical findings. Care plan outlined and precautions discussed. All available results were reviewed with pt. All medications were reviewed with pt. All of pt's questions and concerns were addressed. Pt agrees to F/U as instructed and agrees to return to ED upon further deterioration. Pt is ready to go home. Ana Luisa Kennedy NP    Please note that this dictation was completed with [employer], the computer voice recognition software. Quite often unanticipated grammatical, syntax, homophones, and other interpretive errors are inadvertently transcribed by the computer software. Please disregard these errors. Please excuse any errors that have escaped final proofreading. Thank you.     Procedures

## 2023-02-16 NOTE — ED TRIAGE NOTES
Patient arrives to ed via pov requesting covid test due to sister that she llives with tested positive on Monday and pt sts she is having a dry cough that started last night. Pt denies cp, sob, and abd pain or any further symptoms.

## 2024-01-24 ENCOUNTER — TRANSCRIBE ORDERS (OUTPATIENT)
Facility: HOSPITAL | Age: 84
End: 2024-01-24

## 2024-01-24 ENCOUNTER — HOSPITAL ENCOUNTER (OUTPATIENT)
Facility: HOSPITAL | Age: 84
Discharge: HOME | End: 2024-01-27
Payer: MEDICARE

## 2024-01-24 DIAGNOSIS — K43.9 VENTRAL HERNIA WITHOUT OBSTRUCTION OR GANGRENE: ICD-10-CM

## 2024-01-24 DIAGNOSIS — M47.812 CERVICAL SPINE ARTHRITIS: ICD-10-CM

## 2024-01-24 DIAGNOSIS — E61.1 IRON DEFICIENCY: ICD-10-CM

## 2024-01-24 DIAGNOSIS — Z98.890 HISTORY OF UROSTOMY: ICD-10-CM

## 2024-01-24 DIAGNOSIS — E03.9 HYPOTHYROIDISM, UNSPECIFIED TYPE: ICD-10-CM

## 2024-01-24 DIAGNOSIS — Z85.51 HX OF BLADDER CANCER: ICD-10-CM

## 2024-01-24 DIAGNOSIS — E03.9 HYPOTHYROIDISM, UNSPECIFIED TYPE: Primary | ICD-10-CM

## 2024-01-24 DIAGNOSIS — I83.893 SYMPTOMATIC VARICOSE VEINS OF BOTH LOWER EXTREMITIES: ICD-10-CM

## 2024-01-24 PROCEDURE — 71046 X-RAY EXAM CHEST 2 VIEWS: CPT

## 2024-02-05 ENCOUNTER — HOSPITAL ENCOUNTER (OUTPATIENT)
Facility: HOSPITAL | Age: 84
Discharge: HOME | End: 2024-02-08
Payer: MEDICARE

## 2024-02-05 DIAGNOSIS — R93.89 ABNORMAL CHEST XRAY: ICD-10-CM

## 2024-02-05 PROCEDURE — 71250 CT THORAX DX C-: CPT

## 2025-02-10 ENCOUNTER — HOSPITAL ENCOUNTER (OUTPATIENT)
Facility: HOSPITAL | Age: 85
Discharge: HOME | End: 2025-02-13
Attending: FAMILY MEDICINE
Payer: MEDICARE

## 2025-02-10 DIAGNOSIS — R91.1 RIGHT MIDDLE LOBE PULMONARY NODULE: ICD-10-CM

## 2025-02-10 PROCEDURE — 71250 CT THORAX DX C-: CPT

## 2025-06-25 ENCOUNTER — TRANSCRIBE ORDERS (OUTPATIENT)
Facility: HOSPITAL | Age: 85
End: 2025-06-25

## 2025-06-25 DIAGNOSIS — M89.9 BONE LESION: Primary | ICD-10-CM

## 2025-07-09 ENCOUNTER — HOSPITAL ENCOUNTER (OUTPATIENT)
Facility: HOSPITAL | Age: 85
Discharge: HOME | End: 2025-07-12
Payer: MEDICARE

## 2025-07-09 DIAGNOSIS — M89.9 BONE LESION: ICD-10-CM

## 2025-07-09 PROCEDURE — 6360000004 HC RX CONTRAST MEDICATION: Performed by: STUDENT IN AN ORGANIZED HEALTH CARE EDUCATION/TRAINING PROGRAM

## 2025-07-09 PROCEDURE — A9579 GAD-BASE MR CONTRAST NOS,1ML: HCPCS | Performed by: STUDENT IN AN ORGANIZED HEALTH CARE EDUCATION/TRAINING PROGRAM

## 2025-07-09 PROCEDURE — 73720 MRI LWR EXTREMITY W/O&W/DYE: CPT

## 2025-07-09 RX ORDER — GADOTERIDOL 279.3 MG/ML
12 INJECTION INTRAVENOUS ONCE
Status: COMPLETED | OUTPATIENT
Start: 2025-07-09 | End: 2025-07-09

## 2025-07-09 RX ADMIN — GADOTERIDOL 12 ML: 279.3 INJECTION, SOLUTION INTRAVENOUS at 09:25
